# Patient Record
(demographics unavailable — no encounter records)

---
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
## 2025-04-18 NOTE — DISCUSSION/SUMMARY
[de-identified] : A discussion regarding available pain management treatment options occurred with the patient. These included interventional, rehabilitative, pharmacological, and alternative modalities. We will proceed with the following:   Imagin. MRI lumbar and cervical spine w/o contrast to evaluate for anatomic changes of the lumbar and cervical discs, nerves, and surrounding tissue that will help provide information to accurately diagnose the patient's cause of pain and therefore treat said pain generator in the most effective way possible - whether that be specific physical therapy recommendations, medications, and/or interventional therapies.   Testin. We are obtaining an EMG of the upper and lower extremities to assess the health of muscles and the nerves that control them   Rehabilitative options: 1. Participate in physical therapy for the cervical and lumbar spine, 2/3x per week for 6-8 weeks.  Physical therapy of the lumbar and cervical spine 2-3 times a week for 4-8 weeks stressing a home exercise program of walking, shoulder griddle strengthening, swimming, elliptical , recumbent bike, Mauri chi and Yoga. Use things that heat like hot shower or icy heat before rehab, exercising and at the beginning of the day, and ice (ice in a bag never directly on the skin) after activity and at the end of the day.    Complementary treatment options: - lifestyle modifications discussed - avoid bending and bend with knees - avoid heavy lifting   - Follow up in 2-3 weeks to discuss imaging.   I Arely Lowry attest that this documentation has been prepared under the direction and in the presence of provider Dr. Gunner Alexander.  The documentation recorded by the scribe in my presence, accurately reflects the service I personally performed, and the decisions made by me with my edits as appropriate.   Gunner Alexander, DO

## 2025-04-18 NOTE — DISCUSSION/SUMMARY
[de-identified] : A discussion regarding available pain management treatment options occurred with the patient. These included interventional, rehabilitative, pharmacological, and alternative modalities. We will proceed with the following:   Imagin. MRI lumbar and cervical spine w/o contrast to evaluate for anatomic changes of the lumbar and cervical discs, nerves, and surrounding tissue that will help provide information to accurately diagnose the patient's cause of pain and therefore treat said pain generator in the most effective way possible - whether that be specific physical therapy recommendations, medications, and/or interventional therapies.   Testin. We are obtaining an EMG of the upper and lower extremities to assess the health of muscles and the nerves that control them   Rehabilitative options: 1. Participate in physical therapy for the cervical and lumbar spine, 2/3x per week for 6-8 weeks.  Physical therapy of the lumbar and cervical spine 2-3 times a week for 4-8 weeks stressing a home exercise program of walking, shoulder griddle strengthening, swimming, elliptical , recumbent bike, Mauri chi and Yoga. Use things that heat like hot shower or icy heat before rehab, exercising and at the beginning of the day, and ice (ice in a bag never directly on the skin) after activity and at the end of the day.    Complementary treatment options: - lifestyle modifications discussed - avoid bending and bend with knees - avoid heavy lifting   - Follow up in 2-3 weeks to discuss imaging.   I Arely Lowry attest that this documentation has been prepared under the direction and in the presence of provider Dr. Gunner Alexander.  The documentation recorded by the scribe in my presence, accurately reflects the service I personally performed, and the decisions made by me with my edits as appropriate.   Gunner Alexander, DO

## 2025-04-18 NOTE — PHYSICAL EXAM
[de-identified] : C spine  No rashes, erythema, edema noted TTP b/l cervical paraspinal and trapezius muscles Positive spurlings bilaterally RUE: 5/5 strength LUE: 5/5 strength   L spine   No rashes, erythema, edema noted TTP b/l lumbar paraspinals and sciatic notch Positive straight leg raise bilaterally LLE: 5/5 strength RLE: 5/5 strength Sensation intact b/l LE

## 2025-04-18 NOTE — PHYSICAL EXAM
[de-identified] : C spine  No rashes, erythema, edema noted TTP b/l cervical paraspinal and trapezius muscles Positive spurlings bilaterally RUE: 5/5 strength LUE: 5/5 strength   L spine   No rashes, erythema, edema noted TTP b/l lumbar paraspinals and sciatic notch Positive straight leg raise bilaterally LLE: 5/5 strength RLE: 5/5 strength Sensation intact b/l LE

## 2025-04-24 NOTE — DISCUSSION/SUMMARY
[de-identified] : A discussion regarding available pain management treatment options occurred with the patient. These included interventional, rehabilitative, pharmacological, and alternative modalities. We will proceed with the following:   Imaging: * I advised the patient to schedule her MRI of the cervical and lumbar spine ASAP.  1. MRI lumbar and cervical spine w/o contrast to evaluate for anatomic changes of the lumbar and cervical discs, nerves, and surrounding tissue that will help provide information to accurately diagnose the patient's cause of pain and therefore treat said pain generator in the most effective way possible - whether that be specific physical therapy recommendations, medications, and/or interventional therapies.   Testin. We are obtaining an EMG of the upper and lower extremities to assess the health of muscles and the nerves that control them   Medications: 1. Ordered Gabapentin 300 mg q8h prn 930-day supply, 90 tablets)  We are going to start gabapentin. Potential side effects were discussed which included dizziness, sedation, and pedal edema to name a few. If the patient cannot tolerate these side effects should they occur, then they will stop the medication. If the patient experiences sedation, they understand that they should not drive. The usage of the medication was discussed and the patient understands that it is an anti-epileptic medication used for neuropathic pain and that the pain relief from this medication will likely be subtle, and that hopefully in combination with the other treatments we are offering we will be able to get some additive relief.   Complementary treatment options: - lifestyle modifications discussed - avoid bending and bend with knees - avoid heavy lifting   - Follow up in 2-3 weeks to discuss imaging.   I Arely Lowry attest that this documentation has been prepared under the direction and in the presence of provider Dr. Gunner Alexander.  The documentation recorded by the scribe in my presence, accurately reflects the service I personally performed, and the decisions made by me with my edits as appropriate.   Gunner Alexander, DO

## 2025-04-24 NOTE — DISCUSSION/SUMMARY
[de-identified] : A discussion regarding available pain management treatment options occurred with the patient. These included interventional, rehabilitative, pharmacological, and alternative modalities. We will proceed with the following:   Imaging: * I advised the patient to schedule her MRI of the cervical and lumbar spine ASAP.  1. MRI lumbar and cervical spine w/o contrast to evaluate for anatomic changes of the lumbar and cervical discs, nerves, and surrounding tissue that will help provide information to accurately diagnose the patient's cause of pain and therefore treat said pain generator in the most effective way possible - whether that be specific physical therapy recommendations, medications, and/or interventional therapies.   Testin. We are obtaining an EMG of the upper and lower extremities to assess the health of muscles and the nerves that control them   Medications: 1. Ordered Gabapentin 300 mg q8h prn 930-day supply, 90 tablets)  We are going to start gabapentin. Potential side effects were discussed which included dizziness, sedation, and pedal edema to name a few. If the patient cannot tolerate these side effects should they occur, then they will stop the medication. If the patient experiences sedation, they understand that they should not drive. The usage of the medication was discussed and the patient understands that it is an anti-epileptic medication used for neuropathic pain and that the pain relief from this medication will likely be subtle, and that hopefully in combination with the other treatments we are offering we will be able to get some additive relief.   Complementary treatment options: - lifestyle modifications discussed - avoid bending and bend with knees - avoid heavy lifting   - Follow up in 2-3 weeks to discuss imaging.   I Arely Lowry attest that this documentation has been prepared under the direction and in the presence of provider Dr. Gunner Alexander.  The documentation recorded by the scribe in my presence, accurately reflects the service I personally performed, and the decisions made by me with my edits as appropriate.   Gunner Alexander, DO

## 2025-04-24 NOTE — PHYSICAL EXAM
[de-identified] : C spine  No rashes, erythema, edema noted TTP b/l cervical paraspinal and trapezius muscles Positive spurlings bilaterally RUE: 5/5 strength LUE: 5/5 strength   L spine   No rashes, erythema, edema noted TTP b/l lumbar paraspinals and sciatic notch Positive straight leg raise bilaterally LLE: 5/5 strength RLE: 5/5 strength Sensation intact b/l LE

## 2025-04-24 NOTE — PHYSICAL EXAM
[de-identified] : C spine  No rashes, erythema, edema noted TTP b/l cervical paraspinal and trapezius muscles Positive spurlings bilaterally RUE: 5/5 strength LUE: 5/5 strength   L spine   No rashes, erythema, edema noted TTP b/l lumbar paraspinals and sciatic notch Positive straight leg raise bilaterally LLE: 5/5 strength RLE: 5/5 strength Sensation intact b/l LE
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
## 2025-05-15 NOTE — PHYSICAL EXAM
[de-identified] : C spine  No rashes, erythema, edema noted TTP b/l cervical paraspinal and trapezius muscles Positive spurlings bilaterally RUE: 5/5 strength LUE: 5/5 strength   L spine   No rashes, erythema, edema noted TTP b/l lumbar paraspinals and sciatic notch Positive straight leg raise bilaterally LLE: 5/5 strength RLE: 5/5 strength Sensation intact b/l LE

## 2025-05-15 NOTE — DATA REVIEWED
[FreeTextEntry1] : MRI of the cervical spine taken on 4- @ [facility] showed no evidence of fracture or dislocation. Slight spinal curvature with mild rotary component and straightening or sagittal lordosis. Otherwise normal cervical spine.   MRI of the lumbar spine taken on 4- @ [facility] showed no evidence of fracture or dislocation. L5-S1 right central very small subligamentous disc herniation impinging on the right S1 roots and the right foramen. L4-5 shallow small subligamentous left central and left foraminal disc herniation in close apposition to the descending L5 foraminal roots.
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
## 2025-05-15 NOTE — PHYSICAL EXAM
[de-identified] : C spine  No rashes, erythema, edema noted TTP b/l cervical paraspinal and trapezius muscles Positive spurlings bilaterally RUE: 5/5 strength LUE: 5/5 strength   L spine   No rashes, erythema, edema noted TTP b/l lumbar paraspinals and sciatic notch Positive straight leg raise bilaterally LLE: 5/5 strength RLE: 5/5 strength Sensation intact b/l LE

## 2025-05-15 NOTE — DISCUSSION/SUMMARY
[de-identified] : A discussion regarding available pain management treatment options occurred with the patient. These included interventional, rehabilitative, pharmacological, and alternative modalities. We will proceed with the following:   Rehabilitative options: 1. Participate in physical therapy for the cervical and lumbar spine.  Physical therapy of the lumbar/cervical spine 2-3 times a week for 4-8 weeks stressing a home exercise program of walking, shoulder griddle strengthening,  swimming, elliptical , recumbent bike, Mauri chi and Yoga. Use things that heat like hot shower or icy heat before rehab, exercising and at the beginning of the day, and ice (ice in a bag never directly on the skin) after activity and at the end of the day.   2. Participate in an active HEP. Patient given specific exercises to do including but not limited to: side plank, Quadruped arm/leg raise, Extension exercise, and Glut Bridges   Medications: - continue with Gabapentin 300 mg prn (not due for refill)  Gabapentin. Potential side effects were discussed which included dizziness, sedation, and pedal edema to name a few. If the patient cannot tolerate these side effects should they occur, then they will stop the medication. If the patient experiences sedation, they understand that they should not drive. The usage of the medication was discussed and the patient understands that it is an anti-epileptic medication used for neuropathic pain and that the pain relief from this medication will likely be subtle, and that hopefully in combination with the other treatments we are offering we will be able to get some additive relief.   Complementary treatment options: - lifestyle modifications discussed - avoid bending and bend with knees - avoid heavy lifting   - Follow up in 6 weeks.   I Arely Lowry attest that this documentation has been prepared under the direction and in the presence of provider Dr. Gunner Alexander.  The documentation recorded by the scribe in my presence, accurately reflects the service I personally performed, and the decisions made by me with my edits as appropriate.   Gunner Alexander, DO

## 2025-05-15 NOTE — DISCUSSION/SUMMARY
[de-identified] : A discussion regarding available pain management treatment options occurred with the patient. These included interventional, rehabilitative, pharmacological, and alternative modalities. We will proceed with the following:   Rehabilitative options: 1. Participate in physical therapy for the cervical and lumbar spine.  Physical therapy of the lumbar/cervical spine 2-3 times a week for 4-8 weeks stressing a home exercise program of walking, shoulder griddle strengthening,  swimming, elliptical , recumbent bike, Mauri chi and Yoga. Use things that heat like hot shower or icy heat before rehab, exercising and at the beginning of the day, and ice (ice in a bag never directly on the skin) after activity and at the end of the day.   2. Participate in an active HEP. Patient given specific exercises to do including but not limited to: side plank, Quadruped arm/leg raise, Extension exercise, and Glut Bridges   Medications: - continue with Gabapentin 300 mg prn (not due for refill)  Gabapentin. Potential side effects were discussed which included dizziness, sedation, and pedal edema to name a few. If the patient cannot tolerate these side effects should they occur, then they will stop the medication. If the patient experiences sedation, they understand that they should not drive. The usage of the medication was discussed and the patient understands that it is an anti-epileptic medication used for neuropathic pain and that the pain relief from this medication will likely be subtle, and that hopefully in combination with the other treatments we are offering we will be able to get some additive relief.   Complementary treatment options: - lifestyle modifications discussed - avoid bending and bend with knees - avoid heavy lifting   - Follow up in 6 weeks.   I Arely Lowry attest that this documentation has been prepared under the direction and in the presence of provider Dr. Gunner Alexander.  The documentation recorded by the scribe in my presence, accurately reflects the service I personally performed, and the decisions made by me with my edits as appropriate.   Gunner Alexander, DO